# Patient Record
Sex: MALE | Race: WHITE | NOT HISPANIC OR LATINO | Employment: OTHER | URBAN - NONMETROPOLITAN AREA
[De-identification: names, ages, dates, MRNs, and addresses within clinical notes are randomized per-mention and may not be internally consistent; named-entity substitution may affect disease eponyms.]

---

## 2024-03-27 DIAGNOSIS — M25.512 LEFT SHOULDER PAIN, UNSPECIFIED CHRONICITY: Primary | ICD-10-CM

## 2024-03-29 ENCOUNTER — OFFICE VISIT (OUTPATIENT)
Dept: ORTHOPEDICS | Facility: OTHER | Age: 74
End: 2024-03-29
Attending: SPECIALIST
Payer: MEDICARE

## 2024-03-29 ENCOUNTER — HOSPITAL ENCOUNTER (OUTPATIENT)
Dept: GENERAL RADIOLOGY | Facility: OTHER | Age: 74
Discharge: HOME | End: 2024-03-29
Attending: SPECIALIST
Payer: MEDICARE

## 2024-03-29 VITALS
HEART RATE: 70 BPM | WEIGHT: 180 LBS | SYSTOLIC BLOOD PRESSURE: 146 MMHG | OXYGEN SATURATION: 99 % | DIASTOLIC BLOOD PRESSURE: 76 MMHG | HEIGHT: 75 IN | BODY MASS INDEX: 22.38 KG/M2

## 2024-03-29 DIAGNOSIS — M25.512 LEFT SHOULDER PAIN, UNSPECIFIED CHRONICITY: Primary | ICD-10-CM

## 2024-03-29 DIAGNOSIS — M25.512 LEFT SHOULDER PAIN, UNSPECIFIED CHRONICITY: ICD-10-CM

## 2024-03-29 PROCEDURE — 20550 NJX 1 TENDON SHEATH/LIGAMENT: CPT | Mod: LT | Performed by: SPECIALIST

## 2024-03-29 PROCEDURE — 73030 X-RAY EXAM OF SHOULDER: CPT | Mod: LT

## 2024-03-29 PROCEDURE — 20550 NJX 1 TENDON SHEATH/LIGAMENT: CPT | Performed by: SPECIALIST

## 2024-03-29 PROCEDURE — 20610 DRAIN/INJ JOINT/BURSA W/O US: CPT | Mod: LT | Performed by: SPECIALIST

## 2024-03-29 PROCEDURE — 99203 OFFICE O/P NEW LOW 30 MIN: CPT | Mod: 25 | Performed by: SPECIALIST

## 2024-03-29 PROCEDURE — 250N000011 HC RX IP 250 OP 636: Performed by: SPECIALIST

## 2024-03-29 PROCEDURE — G0463 HOSPITAL OUTPT CLINIC VISIT: HCPCS

## 2024-03-29 RX ORDER — ASPIRIN 81 MG/1
TABLET ORAL
COMMUNITY
Start: 1995-01-01

## 2024-03-29 RX ORDER — SIMVASTATIN 20 MG
TABLET ORAL
COMMUNITY

## 2024-03-29 RX ORDER — BUPIVACAINE HYDROCHLORIDE 5 MG/ML
1 INJECTION, SOLUTION EPIDURAL; INTRACAUDAL ONCE
Status: COMPLETED | OUTPATIENT
Start: 2024-03-29 | End: 2024-03-29

## 2024-03-29 RX ORDER — TRIAMCINOLONE ACETONIDE 40 MG/ML
40 INJECTION, SUSPENSION INTRA-ARTICULAR; INTRAMUSCULAR ONCE
Status: COMPLETED | OUTPATIENT
Start: 2024-03-29 | End: 2024-03-29

## 2024-03-29 RX ADMIN — TRIAMCINOLONE ACETONIDE 40 MG: 40 INJECTION, SUSPENSION INTRA-ARTICULAR; INTRAMUSCULAR at 10:30

## 2024-03-29 RX ADMIN — BUPIVACAINE HYDROCHLORIDE 5 MG: 5 INJECTION, SOLUTION EPIDURAL; INTRACAUDAL; PERINEURAL at 10:29

## 2024-03-29 ASSESSMENT — PAIN SCALES - GENERAL: PAINLEVEL: MILD PAIN (2)

## 2024-03-29 NOTE — PROGRESS NOTES
Surgical Clinic Consult  Primary physician:     Jackie Bay      History of present illness:  This is a 73 year old male I am seeing in consultation for left shoulder pain.  Patient reports she has had chronic mild shoulder pain but this became much worse last week after shoveling.  He has difficulty sleeping on that side.    Past medical history:   No past medical history on file.    Pastsurgical history:  Past Surgical History:   Procedure Laterality Date    CLOSED REDUCTION ANKLE      04/6/11,Left ankle ORIF, Dr. Gregg , [facility] in [place]       Current medications:  Current Outpatient Medications   Medication Sig Dispense Refill    aspirin 81 MG EC tablet       simvastatin (ZOCOR) 20 MG tablet TAKE 1 TABLET BY MOUTH ONCE DAILY IN THE EVENING WITH SUPPER         Allergies:  No Known Allergies    Family history:  No family history on file.    Social history:  Social History     Socioeconomic History    Marital status:      Spouse name: Not on file    Number of children: Not on file    Years of education: Not on file    Highest education level: Not on file   Occupational History    Not on file   Tobacco Use    Smoking status: Not on file    Smokeless tobacco: Not on file   Substance and Sexual Activity    Alcohol use: Not on file    Drug use: Not on file    Sexual activity: Not on file   Other Topics Concern    Not on file   Social History Narrative    Preload   11/4/2013     Social Determinants of Health     Financial Resource Strain: Not on file   Food Insecurity: Not on file   Transportation Needs: Not on file   Physical Activity: Not on file   Stress: Not on file   Social Connections: Not on file   Interpersonal Safety: Not on file   Housing Stability: Not on file       PROBLEM LIST:  There is no problem list on file for this patient.      Review of Systems:  COMPLETE 12 point REVIEW OF SYSTEMS is otherwise negative with the exception of which is stated above.    Physical exam: BP (!)  "146/76 (BP Location: Right arm, Patient Position: Sitting)   Pulse 70   Ht 1.905 m (6' 3\")   Wt 81.6 kg (180 lb)   SpO2 99%   BMI 22.50 kg/m      General: this is a pleasant male patient in no acute distress.  Patient is awake alert and oriented x3 .  Well-groomed, well kempt.  EXAM:  Musculoskeletal: Examination of both shoulders shows no evidence of obvious atrophy.  Shoulder range of motion is well-preserved he does have tenderness with palpation over the biceps tendon AC joint is minimally tender cross-body adduction is mildly tender.    Imaging: X-rays of the left shoulder reviewed 3 views dated 3/29/2024 these reveal the acromiohumeral distance to be well-maintained no evidence of significant arthrosis.    Assessment:   Left shoulder bicipital tendinitis.    Plan:    Proceed with injection over the left biceps tendon.    Procedure note: The shoulder left was prepped with alcohol.  The left shoulder biceps tendon was injected with 1 cc of Kenalog 40 mg/cc and 1 cc of 0.25% Marcaine without epinephrine.  A Band-Aid was applied.  There were no complications.       Nehemias Mansfield MD         "

## 2024-10-10 ENCOUNTER — OFFICE VISIT (OUTPATIENT)
Dept: ORTHOPEDICS | Facility: OTHER | Age: 74
End: 2024-10-10
Attending: SPECIALIST
Payer: MEDICARE

## 2024-10-10 DIAGNOSIS — M25.512 LEFT SHOULDER PAIN, UNSPECIFIED CHRONICITY: Primary | ICD-10-CM

## 2024-10-10 PROCEDURE — 20610 DRAIN/INJ JOINT/BURSA W/O US: CPT | Mod: LT

## 2024-10-10 PROCEDURE — 250N000011 HC RX IP 250 OP 636: Performed by: SPECIALIST

## 2024-10-10 PROCEDURE — G0463 HOSPITAL OUTPT CLINIC VISIT: HCPCS

## 2024-10-10 PROCEDURE — 20610 DRAIN/INJ JOINT/BURSA W/O US: CPT | Mod: LT | Performed by: SPECIALIST

## 2024-10-10 RX ORDER — TRIAMCINOLONE ACETONIDE 40 MG/ML
40 INJECTION, SUSPENSION INTRA-ARTICULAR; INTRAMUSCULAR ONCE
Status: COMPLETED | OUTPATIENT
Start: 2024-10-10 | End: 2024-10-10

## 2024-10-10 RX ORDER — BUPIVACAINE HYDROCHLORIDE 5 MG/ML
1 INJECTION, SOLUTION EPIDURAL; INTRACAUDAL ONCE
Status: COMPLETED | OUTPATIENT
Start: 2024-10-10 | End: 2024-10-10

## 2024-10-10 RX ADMIN — BUPIVACAINE HYDROCHLORIDE 5 MG: 5 INJECTION, SOLUTION EPIDURAL; INTRACAUDAL; PERINEURAL at 11:58

## 2024-10-10 RX ADMIN — TRIAMCINOLONE ACETONIDE 40 MG: 40 INJECTION, SUSPENSION INTRA-ARTICULAR; INTRAMUSCULAR at 11:59

## 2024-10-10 NOTE — PROGRESS NOTES
Subjective:    Patient returns for follow-up with respect to his left shoulder.  Previous injection of the bicipital tendon gave him good relief of symptoms.  He has now developed pain more posteriorly.  He has difficulty sleeping on that side.  Overall his strength is well-preserved.  No history of trauma.    Objective:    Examination shows well-maintained range of motion including forward flexion abduction and external rotation.  He has a negative lift off sign.  Strength is somewhat reduced but likely appropriate for age.  He has a painful impingement arc with positive Jareth maneuver.  Imaging: No new imaging   Assessment:    Probable left shoulder impingement rotator cuff tendinitis    Plan:    Proceed with injection of the subacromial space to see if this gives good good relief if his symptoms persist would ultimately proceed with further imaging.    Procedure note: The left shoulder was prepped with alcohol.  The left subacromial space was injected with 1 cc of Kenalog 40 mg/cc and 1 cc of 0.25% Marcaine without epinephrine.  A Band-Aid was applied.  There were no complications.     Nehemias Mansfield MD

## 2025-07-09 DIAGNOSIS — M79.671 RIGHT FOOT PAIN: Primary | ICD-10-CM

## 2025-07-10 ENCOUNTER — OFFICE VISIT (OUTPATIENT)
Dept: ORTHOPEDICS | Facility: OTHER | Age: 75
End: 2025-07-10
Attending: PODIATRIST
Payer: MEDICARE

## 2025-07-10 ENCOUNTER — HOSPITAL ENCOUNTER (OUTPATIENT)
Dept: GENERAL RADIOLOGY | Facility: OTHER | Age: 75
End: 2025-07-10
Attending: PODIATRIST
Payer: MEDICARE

## 2025-07-10 VITALS — HEART RATE: 97 BPM | OXYGEN SATURATION: 98 %

## 2025-07-10 DIAGNOSIS — Z09 POSTOPERATIVE FOLLOW-UP: Primary | ICD-10-CM

## 2025-07-10 DIAGNOSIS — M79.671 RIGHT FOOT PAIN: ICD-10-CM

## 2025-07-10 PROCEDURE — 73630 X-RAY EXAM OF FOOT: CPT | Mod: RT

## 2025-07-10 PROCEDURE — G0463 HOSPITAL OUTPT CLINIC VISIT: HCPCS

## 2025-07-10 ASSESSMENT — PAIN SCALES - GENERAL: PAINLEVEL_OUTOF10: NO PAIN (0)

## 2025-07-10 NOTE — PROGRESS NOTES
SUBJECTIVE:  Chriss is a 74-year-old gentleman here seen regarding pain in the lateral forefoot.  He had a callus here was debrided by primary improved symptoms immediately thought maybe had a corn.  He is little bit of a bony prominence here.  Here to have this evaluated discussed options.    ROS: Musculoskeletal and general review of systems are negative, per review of previous clinic questionnaire.  Denies SOB and calf pain.    EXAM:   PHYSICAL EXAMINATION:   CONSTITUTIONAL:  The patient is alert and oriented x 3, well appearing and in no apparent distress.  Affect is pleasant and answers questions appropriately.  VASCULAR:  Circulation is intact with palpable pedal pulses and adequate capillary fill time to all digits.  Hair growth is present and appropriate to mid foot and digits. Calf nontender.  NEUROLOGIC:  Light touch sensation is intact to digits.  There is a negative Tinel sign.    INTEGUMENT:  No abnormal dermatologic lesions are noted.  Skin has normal texture and turgor.    MUSCULOSKELETAL: Mild cavus foot structure, mild tailor's bunion he does have evidence of a callus which was debrided there is a slight core here bit of a corn or IPK type lesion is nontender to palpate.  Tailor's bunion is asymptomatic today.    IMAGING: X-rays taken today demonstrate no acute concerns mild tailor's bunion deformity    ASSESSMENT: Discussed condition and treatment patient today.      PLAN OF CARE: Unsure if this IPK is associated with the tailor's bunion slightly anterior to this.  And is asymptomatic today recommended self-care at home with corn pad and self debridement with pumice stone after getting out of the shower.  With worsening reappoint.    Alfonso Sommer DPM

## (undated) RX ORDER — TRIAMCINOLONE ACETONIDE 40 MG/ML
INJECTION, SUSPENSION INTRA-ARTICULAR; INTRAMUSCULAR
Status: DISPENSED
Start: 2024-03-29

## (undated) RX ORDER — BUPIVACAINE HYDROCHLORIDE 5 MG/ML
INJECTION, SOLUTION EPIDURAL; INTRACAUDAL
Status: DISPENSED
Start: 2024-03-29

## (undated) RX ORDER — TRIAMCINOLONE ACETONIDE 40 MG/ML
INJECTION, SUSPENSION INTRA-ARTICULAR; INTRAMUSCULAR
Status: DISPENSED
Start: 2024-10-10

## (undated) RX ORDER — BUPIVACAINE HYDROCHLORIDE 5 MG/ML
INJECTION, SOLUTION EPIDURAL; INTRACAUDAL
Status: DISPENSED
Start: 2024-10-10